# Patient Record
Sex: MALE | Race: WHITE | HISPANIC OR LATINO
[De-identification: names, ages, dates, MRNs, and addresses within clinical notes are randomized per-mention and may not be internally consistent; named-entity substitution may affect disease eponyms.]

---

## 2023-04-20 ENCOUNTER — DASHBOARD ENCOUNTERS (OUTPATIENT)
Age: 28
End: 2023-04-20

## 2023-04-20 ENCOUNTER — OFFICE VISIT (OUTPATIENT)
Dept: URBAN - METROPOLITAN AREA CLINIC 7 | Facility: CLINIC | Age: 28
End: 2023-04-20
Payer: COMMERCIAL

## 2023-04-20 ENCOUNTER — WEB ENCOUNTER (OUTPATIENT)
Dept: URBAN - METROPOLITAN AREA CLINIC 7 | Facility: CLINIC | Age: 28
End: 2023-04-20

## 2023-04-20 VITALS
BODY MASS INDEX: 24.48 KG/M2 | DIASTOLIC BLOOD PRESSURE: 80 MMHG | HEIGHT: 70 IN | SYSTOLIC BLOOD PRESSURE: 122 MMHG | TEMPERATURE: 97.8 F | WEIGHT: 171 LBS

## 2023-04-20 DIAGNOSIS — R10.30 LOWER ABDOMINAL PAIN: ICD-10-CM

## 2023-04-20 DIAGNOSIS — K92.1 BLOOD IN STOOL: ICD-10-CM

## 2023-04-20 DIAGNOSIS — R19.7 DIARRHEA, UNSPECIFIED TYPE: ICD-10-CM

## 2023-04-20 DIAGNOSIS — R15.2 DEFECATION URGENCY: ICD-10-CM

## 2023-04-20 PROCEDURE — 99244 OFF/OP CNSLTJ NEW/EST MOD 40: CPT | Performed by: STUDENT IN AN ORGANIZED HEALTH CARE EDUCATION/TRAINING PROGRAM

## 2023-04-20 RX ORDER — LISDEXAMFETAMINE DIMESYLATE 50 MG/1
CAPSULE ORAL
Qty: 30 APPLICATOR | Refills: 0 | Status: ACTIVE | COMMUNITY

## 2023-04-20 NOTE — HPI-TODAY'S VISIT:
Patient has a history of ADHD on vynase who presents for frequent diarrhea, no blood/mucous/change in weight. Stool cx negative.  GI Hx: 4/20/23-  2-3 years of intermittent loose stools (60%). +Urgency. Gas/cramping in lower abdomen. Two episode of blood in stool. Worse with spicy foods, alcohol, NSAIDs.  Vynase started a few months ago. Previously on aderall.  Denies weight loss, fever, chills, nausea, vomiting Denies dysphagia. Denies hematemesis, melena.  No family hx of IBD or autoimmune hx, colon cancer/polyps.  EGD: None  Colonoscopy: None  Imaging/Studies/Procedures: None

## 2023-05-05 ENCOUNTER — OFFICE VISIT (OUTPATIENT)
Dept: URBAN - METROPOLITAN AREA SURGERY CENTER 5 | Facility: SURGERY CENTER | Age: 28
End: 2023-05-05
Payer: COMMERCIAL

## 2023-05-05 DIAGNOSIS — K64.8 EXTERNAL HEMORRHOIDS: ICD-10-CM

## 2023-05-05 DIAGNOSIS — R19.7 ACUTE DIARRHEA: ICD-10-CM

## 2023-05-05 DIAGNOSIS — K57.30 ACQUIRED DIVERTICULOSIS OF COLON: ICD-10-CM

## 2023-05-05 PROCEDURE — 45380 COLONOSCOPY AND BIOPSY: CPT | Performed by: STUDENT IN AN ORGANIZED HEALTH CARE EDUCATION/TRAINING PROGRAM

## 2023-05-05 RX ORDER — LISDEXAMFETAMINE DIMESYLATE 50 MG/1
CAPSULE ORAL
Qty: 30 APPLICATOR | Refills: 0 | Status: ACTIVE | COMMUNITY

## 2023-08-17 LAB
(TTG) AB, IGA: <1
(TTG) AB, IGG: <1
ABSOLUTE BASOPHILS: 17
ABSOLUTE EOSINOPHILS: 80
ABSOLUTE LYMPHOCYTES: 2018
ABSOLUTE MONOCYTES: 371
ABSOLUTE NEUTROPHILS: 3215
BASOPHILS: 0.3
C-REACTIVE PROTEIN, QUANT: 0.8
EOSINOPHILS: 1.4
GLIADIN (DEAMIDATED) AB (IGA): <1
GLIADIN (DEAMIDATED) AB (IGG): <1
HEMATOCRIT: 42.2
HEMOGLOBIN: 14.3
IMMUNOGLOBULIN A: 193
LYMPHOCYTES: 35.4
MCH: 31.3
MCHC: 33.9
MCV: 92.3
MONOCYTES: 6.5
MPV: 11
NEUTROPHILS: 56.4
PLATELET COUNT: 240
RDW: 11.9
RED BLOOD CELL COUNT: 4.57
TSH: 0.75
WHITE BLOOD CELL COUNT: 5.7